# Patient Record
Sex: MALE | Race: WHITE | Employment: STUDENT | URBAN - METROPOLITAN AREA
[De-identification: names, ages, dates, MRNs, and addresses within clinical notes are randomized per-mention and may not be internally consistent; named-entity substitution may affect disease eponyms.]

---
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
## 2023-01-23 NOTE — TELEPHONE ENCOUNTER
January 23, 2023      [facility]  [address]  [place] [address]  Phone: [phone]  Fax: [phone]       Patient: Miroslava Machuca   YOB: 1980  Date of Visit: 01/23/2023    To Whom It May Concern:    HERMES Machuca  was at [facility] on 01/23/2023. The patient may return to work/school on 01/24/2023 with no restrictions. If you have any questions or concerns, or if I can be of further assistance, please do not hesitate to contact me.    Sincerely,    Dr. Andriy Sanatna       ----- Message from Florinda Ayon sent at 5/12/2020 12:29 PM EDT -----  Regarding: KAROLYN, Tova/Telephone  Env General Message/Vendor Calls    Caller's first and last name: Earnest Lezama (mother)      Reason for call: Regarding her son has been on antibiotics that doesn't seem to be working and he hasn't been able to urinate. Call back required yes/no and why: Yes       Best contact number(s): [phone]      Details to clarify the request:      Florinda Ayon  .

## 2023-05-19 RX ORDER — FLUTICASONE PROPIONATE 50 MCG
1 SPRAY, SUSPENSION (ML) NASAL DAILY
COMMUNITY
Start: 2020-05-08